# Patient Record
(demographics unavailable — no encounter records)

---

## 2024-12-16 NOTE — REASON FOR VISIT
[Home] : at home, [unfilled] , at the time of the visit. [Medical Office: ([facility])___] : at the medical office located in  [Family Member] : family member [Follow-Up Visit] : a follow-up [Other: _____] : [unfilled] [FreeTextEntry2] : rectal CA

## 2024-12-16 NOTE — CONSULT LETTER
[Dear  ___] : Dear  [unfilled], [Consult Letter:] : I had the pleasure of evaluating your patient, [unfilled]. [Please see my note below.] : Please see my note below. [Consult Closing:] : Thank you very much for allowing me to participate in the care of this patient.  If you have any questions, please do not hesitate to contact me. [Sincerely,] : Sincerely, [DrMarko  ___] : Dr. MONTAGUE

## 2024-12-16 NOTE — ASSESSMENT
[FreeTextEntry1] : Patient with low lying rectal adenocarcinoma. The tumor overlies the rectal sphincters and anal canal. Patient needs imaging for staging with MRI and CT scan. If localized, would likely proceed with OPRA-like treamtent, which involves organ preservation approach, as any surgery would involve an APR approach.  OPRA trial includes chemotherapy with Xeloda and radiation followed by FOLFOX, although XELOX could be reasonably substituted  Xeloda/RT D1 10/6/2021 D30 12/2/2021  C1 FOLFOX 1/5/2022. C4D1 3/2/2022 -delayed due to diarrhea C5 3/30- delayed due to inflammation noted on CT. C9 6/2/22  Rectal CA: We reviewed her CT from 6/4 which reveal CHLOE. She remains in remission. Next CT 12/2024. Next colonoscopy in 2025  Macular degeneration In bilateral eyes and being followed by Dr. Garrison Langley MD. Patient is receiving intravitreal bevacizumab into her R eye. Received a total of 2 doses for now.  During our televisit today we reviewed the folllowing;  Remains CHLOE. Scans were stable. Repeat June 2025 - CT and MR  Squamous cell carcinoma: Found to have cSCC on right lower leg (proximal ankle) Moh's was done by Dr. Burns. No issues.  HTN Currently on Valsartan 160 mg PO BID, Carvedelol 3.125 PO BID and Hydralazine 50 mg PO BID. Recently started on HCTZ 12.5 PRN and PO Potassium.  labs at local [facility] on 12/16  Patient instructed to call Dr. Roe office for repeat flex sig. They will also inquire when next colonoscopy is.  f/u in 6 month

## 2024-12-16 NOTE — HISTORY OF PRESENT ILLNESS
[Disease: _____________________] : Disease: [unfilled] [de-identified] : Mrs. Beatty is a 74 year old female with past medical history of HTN presenting to the office for an initial consultation of rectal CA.  Her last colonoscopy 2017 (benign polyp removed).  Patient presented x one year with fecal urgency, and "explosive diarrhea" and blood in stool over last 3-4 months. Blood in stool has gotten more frequent over time. She underwent colonoscopy on 8/16/2021 which revealed firm anorectal mass, distal rectal polyp and diverticulosis. Pathology of anorectal mass consistent with invasive moderately differentiated adenocarcinoma. IHC + for CK20 and CDX2; negative for CK7 and P40. MMR is proficient  Exam by colorectal surgeon: On digital examination a firm mass is palpable in the right posterolateral position. The mass is somewhat tethered. It was extremely distal. Sigmoidoscopy to 15 cm confirmed the presence of this friable distal anorectal lesion. Anoscopy also confirms the presence of this firm lesion which is friable and has a broken down surface. It is located in the right posterolateral position.  CT chest, abdomen/pelvis 9/9/2021: no evidence of metastatic disease. MR pelvis 9/9/2021: A low rectal cancer entirely contained within the anal canal. Stage: T1/T2 (tumor confined to rectal wall) N0 CRM (T3 tumors): N/A. Sphincter involvement (low rectal tumors): Question invasion of the internal sphincter posteriorly. Suspicious extra mesorectal nodes: None  9/17/2021: 1) rectal CA: Patient is here for follow up accompanied with daughter. She is feeling well today and voices no new complaints. She remains active and has no restrictions with her ADLs. We reviewed next steps which is chemo-RT. Xeloda will be administered on days of radiation only. We reviewed mechanism of action, logistics and most common adverse events. We reviewed labs which demonstrate no acute abnormality.  10/21/21: Patient case discussed at Rectal Psychiatric meeting 9/15/21. Lesion is low rectal and ~ 1.2 cm from AV. It is a clinical T1/T2 lesion as per MRI, and no suspicious nodes. Pathology report was reviewed, and pathology slides have been requested but not yet reviewed. It cannot be removed by transanal excision due to proximity to sphincter. Also, LAR is not possible since too close to sphincter, and an APR would be required. Therefore, we will proceed with combined chemotherapy and radiation, as per OPRA approach. Will also offer post-radiation chemotherapy, as this may further increase long term disease control & organ preservation. Will facilitate radiation and Xeloda initiation. Of note, patient declined genetic counseling appointment; however MMR is proficient. Ms Beatty started RT and Xeloda on 10/6/21. She is scheduled for RT from 10/6/21 to 11/16/21 She has been tolerating the Xeloda dose well. She has good appetite and has energy level.   11/4/21 Patient has developed severe and symptomatic hypotension on Coreg and Losartan. Will lower Coreg to 12.5, and stop Losartan. Discussed with PCP. Will lower Xeloda due to cramps/ nausea/ and diarhea Although DPD is normal, it is not a relaible test.  11/10/2021: Patient restarted Xeloda 2 tabs in AM/PM on Tuesday, 11/9. She is feeling better after having her break from Xeloda/RT due to her recent hospital admission. She was having ~ 10 episodes of diarrhea throughout the day time which has subsided ever since last night. She has no bowel movements today thus far. Patient was seen by her internist and performed lab work. Patient TSH was elevated and her Synthroid was increased from 125 mcg to 137 mcg since today. Her under-active thyroid was also likely causing her to feel increase in fatigue and "cold".   We reviewed her labs which reveal no acute abnormality. Her WBC and ANC are WNL, she received 3 doses of Zarxio last week.   11/22/2021: 1) Rectal CA: Today is D 24/30 Xeloda/RT She is tolerating Xeloda well and reports no significant adverse events. Denies fever, chills, HFS, diarrhea or mouth sores. Admits to mild diarrhea and incomplete evacuation.  She is no longer having "watery" diarrhea. Admits to fatigue but is able to push through.  12/2/2021: 1) Rectal CA:  Today is last day of Xeloda/RT, 30/30 fractions. She tolerated Xeloda relatively well while on treatment and voices no major adverse events. Denies fever, chills, HFS, diarrhea or mouth sores. Admits to rectal irritation/pain and was started on Oxycodone with relief of her symptoms. We reviewed next steps. Patient will require mediport in 1-2 weeks followed by chemotherapy. Patient and daughter are both interested in moving forward with FOLFOX over XELOX. We re-reviewed logistics, mechanism of action and most common adverse events.  12/30/2021 FOLFOX planned for 9 cycles. We reviewed drugs and side effects. Cold sensitivity, neuropathy   2/4/2022: 1) Rectal CA: C1 FOLFOX 1/5/2022. C3D3 FOLFOX today. 2) Hypokalemia: She is currently being treated with Potassium supplement.  Her K+ on 2/2 was 2.7 and Mg+ was 1.5. Will have her repeat CMP on Monday, 2/7/2022. 3) HTN: Due to hypotension Losartan/HCTZ and Carvedelol was DC. Her BP over the past few weeks have been elevated.  Her systolic BP today is 190s. I advised patient to restart Carvedelol twice daily.  Was taking it once daily. She has appointment with her internist on Monday, will discuss with her to restart Losartan-HCTZ 4) Diarrhea : At baseline patient has 3-4 bowel movements/day.  After induction of RT increased to 9-10 times/day. She is now having 3-4 bowel movement/day which is baseline.  3/4/2022: Rectal CA: C4D3 FOLFOX today. lowered  and 5FU 1600 Diarrhea has improved since reduction of treatment. She is having semi-formed stool ~ pencil like in caliber.  She is having ~ 2-3 bowel movements/day. Denies mucus or blood in stool. Denies abdominal pain or cramping. Due to diarrhea her electrolytes were abnormal.  Mg + is 1.4 and patient was advised to continue with Mg Sulfate once daily. K+ 3.6, she will cw KCl 10 meq daily. HTN: Patient is currently on Diovan 40 mg and Carvedilol 25 mg PO BID.  3/30/2022: C5D1 FOLFOX Patient underwent CT chest, abdomen/pelvis on 3/14 which revealed wall thickening of the distal sigmoid and rectum. This may be due to inflammation. Due to inflammation patient was evaluated by Dr. Roe who underwent sigmoidoscope which revealed complete response from treatment. She is doing well today and tolerating treatment relatively well. She denies diarrhea today however admits to intermittent diarrhea based on what she has for a meal.  Her stools are bulkier and denies blood in stool. Occasionally admits to mucus in stool. Denies abdominal pain, nausea, vomiting or neuropathy. We reviewed her labs, K+ during her last cycle was 3.6.  She is currently on maintenance K+ tablets. Due to hypothyroid, patient is currently on Synthroid 175 mcg daily.   4/15/2022: C6D3 FOLFOX today. Patient is doing well on treatment and voices no major adverse events. Admits to cold induce neuropathy only on days of treatment on her fingertips. No PO or LE involvement noted. Denies neuropathy on her her off week of treatment. She has no restrictions with her ADLs. Has intermittent episodes of diarrhea last week, ~ 3-4 bowel movements which has subsided. We reviewed her CMP, K+ was WNL.  4/29/2022: C7D3 FOLFOX today. Patient admits to neuropathy located on her fingertips which has not increased in intensity. Last week noticed mild neuropathy on her toes which has subsided. Patient also drove with her family to [place]. She did well there. Otherwise patient is doing relatively well on treatment. Has grade one fatigue but is able to push through. She is having mild vision changes and was evaluated by opthalmology. She has cataracts. We reviewed her labs, K+ wnl.  5/11/2022: C8D1 FOLFOX today. She is tolerating treatment relatively well and reports no significant adverse events. Admits to cold induce neuropathy located on her fingertips on days 1-7 of her cycle which subsides afterwards. She has no restrictions with using her hands. Admits to neuropathy located on the her toes which last a few days longer however has no bothersome to patient. Has not progressed since last cycle. Her bowel movements change from constipation to loose stools. On average patient has 3 bowel movements/day. We reviewed her labs, no electrolyte abnormality noted.   Overall she is doing well on treatment.  5/27/22 Had to hold treatment on 5/25 due to low platelets. Have planned to give C9 on 6/2/22. Has mild cold sensitivity today, and no weakness.  Main complaint is right low back pain and sciatica. 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 She went to ED on Sat and given Flexeril and Medrol. She felt much better.  Yesterday she fell and hurt her back - right leg gave out. She fell 6 steps onto the landing. Will compare to a few days ago.  7/20/2022 Case reviewed at Psychiatric today. Excellent response on MRI to OPRA-based regimen. WIll need to repeat MRI pelvis + CAT chest/abd q 6 months for 2 years, then annually. Will need labs with CEA and exam by surgeon q 3 months.  Patient notes bruising without noted trauma.  Patient with back pain, and fx L2. Kyphoplasty mid June (Dr John Gamez in [place]).  Port flush next month.  10/17/2022: Rectal CA: Patient underwent colonoscopy with Dr. Roe last week which revealed no malignant lesions.  Repeat colonoscopy x 3 years. Repeat sigmoidoscopy x 3 months. She is feeling relatively well and reports no significant complaints.  Admits to small bowel movements, ~ 3 times/day. No blood in stool. She underwent CT A/P on 09/2022 for w/u for elevated LFTs which revealed no evidence of recurrence and/or metastatic disease. Will order CT chest w/o contrast.  Will perform on @ [facility]. Port flush with labs today. Elevated LFTs: Patient is being evaluated by Dr. Shiv Putnam for her transaminitis.  She was originally seen by Dr. Paez ([facility]) who believes its 2/2 from gabapentin.  After stopping Gabapentin, her LFTs have dropped.  ALK phos is 326, ALT 47 and AST 42 on 9/26/2022. She underwent additional labs on Friday, 10/14/2022. CT A/P and US abdomen on 09/2022 did not reveal any acute process. After DC Gabapentin, her LFTs have improved. Will repeat levels today with port flush.  1/24/2023 Neuropathy - started ~ 4/2022 and still present.  We discussed that this will continue to improve. Had oxali 68 x 9 doses in total, so expect total or near total resolution. Last MRI negative, will get 2x/year Dr P q3 months. Excellent response on MRI to OPRA-based regimen. Increased LFTs - since 4/2022. Not clear.   12/19/23 NEUROPATHY Patient states that her leg neuropathy is stable.  Cumulative dose of oxaliplatin was 612 mg/m2, and would not expect this to worsen. Obtain additional lab panel today. Trial of oral vitamins (MVIT, B12/B complex)  RECTAL CANCER Rectal cancer remains in cCR after chemotherapy and chemo+RT. Last MRI 12/2023 negative. Will expedite CT reading. Will continue surveillance imaging and endoscopy as prior. At this point will recommend to remove mediport.  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 She will discuss treatment or observation with Dr Roe at next visit.  OSTEOPOROSIS she started Fosamax  CIRRHOSIS CT scan showed cirrhotic changes that were just reported, but seen on prior as per radiology. Referred to hepatology - should return annually. Not sure why this has been seen, and given the elevated alk phos, will refer to hepatology. I do not think this would be related to FOLFOX x 9 cycles, lloyd with dose reduced oxaliplatin.  6/12/24 Rectal CA: We reviewed her CT from 6/4 which reveal CHLOE. She remains in remission. Next CT 12/2024.  Macular degeneration In bilateral eyes and being followed by Dr. Garrison Langley MD. Patient is receiving intravitreal bevacizumab into her R eye. Received a total of 2 doses for now.  Depression Since developing macular degeneration patient developed worsening depression. Other factors personally have triggered her depression to worsen. Currently on Lexapro 10 mg daily but will require something else. Will require psychiatry evaluation/  12/16/24 - TEB Rectal CA: Remains CHLOE. Scans were stable. Repeat June 2025   Squamous cell carcinoma: Found to have cSCC on right lower leg (proximal ankle) Moh's was done by Dr. Burns. No issues.  HTN Currently on Valsartan 160 mg PO BID, Carvedelol 3.125 PO BID and Hydralazine 50 mg PO BID. Recently started on HCTZ 12.5 PRN and PO Potassium.  [de-identified] : adenocarcinoma [de-identified] : Colorectal Sx: Jamel Proctolu Radiation Oncology: Joey Miranda GI: Missael Hays ([facility]) PCP: Gabrielle Qaun [phone] Pain management: Franco Lopez ([place]) Dermatology: DEMARCUS Peña ([place]) - [facility] dermatology   Daughter - Aminah - [phone] Patient [phone] Claudia - son

## 2024-12-16 NOTE — REASON FOR VISIT
[Home] : at home, [unfilled] , at the time of the visit. [Medical Office: ([facility])___] : at the medical office located in  [Family Member] : family member [Follow-Up Visit] : a follow-up [Other: _____] : [unfilled] [FreeTextEntry2] : rectal CA

## 2024-12-16 NOTE — PHYSICAL EXAM
[Ambulatory and capable of all self care but unable to carry out any work activities] : Status 2- Ambulatory and capable of all self care but unable to carry out any work activities. Up and about more than 50% of waking hours [Normal] : affect appropriate [Ulcers] : no ulcers [Mucositis] : no mucositis [Thrush] : no thrush [Vesicles] : no vesicles [FreeTextEntry1] : low lying rectal tumor that overlies the spincter in the upper anal canal.

## 2024-12-16 NOTE — CONSULT LETTER
[Dear  ___] : Dear  [unfilled], [Consult Letter:] : I had the pleasure of evaluating your patient, [unfilled]. [Please see my note below.] : Please see my note below. [Consult Closing:] : Thank you very much for allowing me to participate in the care of this patient.  If you have any questions, please do not hesitate to contact me. [Sincerely,] : Sincerely, [DrMarko  ___] : Dr. OMNTAGUE

## 2024-12-16 NOTE — HISTORY OF PRESENT ILLNESS
[Disease: _____________________] : Disease: [unfilled] [de-identified] : Mrs. Beatty is a 74 year old female with past medical history of HTN presenting to the office for an initial consultation of rectal CA.  Her last colonoscopy 2017 (benign polyp removed).  Patient presented x one year with fecal urgency, and "explosive diarrhea" and blood in stool over last 3-4 months. Blood in stool has gotten more frequent over time. She underwent colonoscopy on 8/16/2021 which revealed firm anorectal mass, distal rectal polyp and diverticulosis. Pathology of anorectal mass consistent with invasive moderately differentiated adenocarcinoma. IHC + for CK20 and CDX2; negative for CK7 and P40. MMR is proficient  Exam by colorectal surgeon: On digital examination a firm mass is palpable in the right posterolateral position. The mass is somewhat tethered. It was extremely distal. Sigmoidoscopy to 15 cm confirmed the presence of this friable distal anorectal lesion. Anoscopy also confirms the presence of this firm lesion which is friable and has a broken down surface. It is located in the right posterolateral position.  CT chest, abdomen/pelvis 9/9/2021: no evidence of metastatic disease. MR pelvis 9/9/2021: A low rectal cancer entirely contained within the anal canal. Stage: T1/T2 (tumor confined to rectal wall) N0 CRM (T3 tumors): N/A. Sphincter involvement (low rectal tumors): Question invasion of the internal sphincter posteriorly. Suspicious extra mesorectal nodes: None  9/17/2021: 1) rectal CA: Patient is here for follow up accompanied with daughter. She is feeling well today and voices no new complaints. She remains active and has no restrictions with her ADLs. We reviewed next steps which is chemo-RT. Xeloda will be administered on days of radiation only. We reviewed mechanism of action, logistics and most common adverse events. We reviewed labs which demonstrate no acute abnormality.  10/21/21: Patient case discussed at Rectal [facility] meeting 9/15/21. Lesion is low rectal and ~ 1.2 cm from AV. It is a clinical T1/T2 lesion as per MRI, and no suspicious nodes. Pathology report was reviewed, and pathology slides have been requested but not yet reviewed. It cannot be removed by transanal excision due to proximity to sphincter. Also, LAR is not possible since too close to sphincter, and an APR would be required. Therefore, we will proceed with combined chemotherapy and radiation, as per OPRA approach. Will also offer post-radiation chemotherapy, as this may further increase long term disease control & organ preservation. Will facilitate radiation and Xeloda initiation. Of note, patient declined genetic counseling appointment; however MMR is proficient. Ms Beatty started RT and Xeloda on 10/6/21. She is scheduled for RT from 10/6/21 to 11/16/21 She has been tolerating the Xeloda dose well. She has good appetite and has energy level.   11/4/21 Patient has developed severe and symptomatic hypotension on Coreg and Losartan. Will lower Coreg to 12.5, and stop Losartan. Discussed with PCP. Will lower Xeloda due to cramps/ nausea/ and diarhea Although DPD is normal, it is not a relaible test.  11/10/2021: Patient restarted Xeloda 2 tabs in AM/PM on Tuesday, 11/9. She is feeling better after having her break from Xeloda/RT due to her recent hospital admission. She was having ~ 10 episodes of diarrhea throughout the day time which has subsided ever since last night. She has no bowel movements today thus far. Patient was seen by her internist and performed lab work. Patient TSH was elevated and her Synthroid was increased from 125 mcg to 137 mcg since today. Her under-active thyroid was also likely causing her to feel increase in fatigue and "cold".   We reviewed her labs which reveal no acute abnormality. Her WBC and ANC are WNL, she received 3 doses of Zarxio last week.   11/22/2021: 1) Rectal CA: Today is D 24/30 Xeloda/RT She is tolerating Xeloda well and reports no significant adverse events. Denies fever, chills, HFS, diarrhea or mouth sores. Admits to mild diarrhea and incomplete evacuation.  She is no longer having "watery" diarrhea. Admits to fatigue but is able to push through.  12/2/2021: 1) Rectal CA:  Today is last day of Xeloda/RT, 30/30 fractions. She tolerated Xeloda relatively well while on treatment and voices no major adverse events. Denies fever, chills, HFS, diarrhea or mouth sores. Admits to rectal irritation/pain and was started on Oxycodone with relief of her symptoms. We reviewed next steps. Patient will require mediport in 1-2 weeks followed by chemotherapy. Patient and daughter are both interested in moving forward with FOLFOX over XELOX. We re-reviewed logistics, mechanism of action and most common adverse events.  12/30/2021 FOLFOX planned for 9 cycles. We reviewed drugs and side effects. Cold sensitivity, neuropathy   2/4/2022: 1) Rectal CA: C1 FOLFOX 1/5/2022. C3D3 FOLFOX today. 2) Hypokalemia: She is currently being treated with Potassium supplement.  Her K+ on 2/2 was 2.7 and Mg+ was 1.5. Will have her repeat CMP on Monday, 2/7/2022. 3) HTN: Due to hypotension Losartan/HCTZ and Carvedelol was DC. Her BP over the past few weeks have been elevated.  Her systolic BP today is 190s. I advised patient to restart Carvedelol twice daily.  Was taking it once daily. She has appointment with her internist on Monday, will discuss with her to restart Losartan-HCTZ 4) Diarrhea : At baseline patient has 3-4 bowel movements/day.  After induction of RT increased to 9-10 times/day. She is now having 3-4 bowel movement/day which is baseline.  3/4/2022: Rectal CA: C4D3 FOLFOX today. lowered  and 5FU 1600 Diarrhea has improved since reduction of treatment. She is having semi-formed stool ~ pencil like in caliber.  She is having ~ 2-3 bowel movements/day. Denies mucus or blood in stool. Denies abdominal pain or cramping. Due to diarrhea her electrolytes were abnormal.  Mg + is 1.4 and patient was advised to continue with Mg Sulfate once daily. K+ 3.6, she will cw KCl 10 meq daily. HTN: Patient is currently on Diovan 40 mg and Carvedilol 25 mg PO BID.  3/30/2022: C5D1 FOLFOX Patient underwent CT chest, abdomen/pelvis on 3/14 which revealed wall thickening of the distal sigmoid and rectum. This may be due to inflammation. Due to inflammation patient was evaluated by Dr. Roe who underwent sigmoidoscope which revealed complete response from treatment. She is doing well today and tolerating treatment relatively well. She denies diarrhea today however admits to intermittent diarrhea based on what she has for a meal.  Her stools are bulkier and denies blood in stool. Occasionally admits to mucus in stool. Denies abdominal pain, nausea, vomiting or neuropathy. We reviewed her labs, K+ during her last cycle was 3.6.  She is currently on maintenance K+ tablets. Due to hypothyroid, patient is currently on Synthroid 175 mcg daily.   4/15/2022: C6D3 FOLFOX today. Patient is doing well on treatment and voices no major adverse events. Admits to cold induce neuropathy only on days of treatment on her fingertips. No PO or LE involvement noted. Denies neuropathy on her her off week of treatment. She has no restrictions with her ADLs. Has intermittent episodes of diarrhea last week, ~ 3-4 bowel movements which has subsided. We reviewed her CMP, K+ was WNL.  4/29/2022: C7D3 FOLFOX today. Patient admits to neuropathy located on her fingertips which has not increased in intensity. Last week noticed mild neuropathy on her toes which has subsided. Patient also drove with her family to [place]. She did well there. Otherwise patient is doing relatively well on treatment. Has grade one fatigue but is able to push through. She is having mild vision changes and was evaluated by opthalmology. She has cataracts. We reviewed her labs, K+ wnl.  5/11/2022: C8D1 FOLFOX today. She is tolerating treatment relatively well and reports no significant adverse events. Admits to cold induce neuropathy located on her fingertips on days 1-7 of her cycle which subsides afterwards. She has no restrictions with using her hands. Admits to neuropathy located on the her toes which last a few days longer however has no bothersome to patient. Has not progressed since last cycle. Her bowel movements change from constipation to loose stools. On average patient has 3 bowel movements/day. We reviewed her labs, no electrolyte abnormality noted.   Overall she is doing well on treatment.  5/27/22 Had to hold treatment on 5/25 due to low platelets. Have planned to give C9 on 6/2/22. Has mild cold sensitivity today, and no weakness.  Main complaint is right low back pain and sciatica. 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 She went to ED on Sat and given Flexeril and Medrol. She felt much better.  Yesterday she fell and hurt her back - right leg gave out. She fell 6 steps onto the landing. Will compare to a few days ago.  7/20/2022 Case reviewed at [facility] today. Excellent response on MRI to OPRA-based regimen. WIll need to repeat MRI pelvis + CAT chest/abd q 6 months for 2 years, then annually. Will need labs with CEA and exam by surgeon q 3 months.  Patient notes bruising without noted trauma.  Patient with back pain, and fx L2. Kyphoplasty mid June (Dr John Gamez in [place]).  Port flush next month.  10/17/2022: Rectal CA: Patient underwent colonoscopy with Dr. Roe last week which revealed no malignant lesions.  Repeat colonoscopy x 3 years. Repeat sigmoidoscopy x 3 months. She is feeling relatively well and reports no significant complaints.  Admits to small bowel movements, ~ 3 times/day. No blood in stool. She underwent CT A/P on 09/2022 for w/u for elevated LFTs which revealed no evidence of recurrence and/or metastatic disease. Will order CT chest w/o contrast.  Will perform on @ [facility]. Port flush with labs today. Elevated LFTs: Patient is being evaluated by Dr. Shiv Putnam for her transaminitis.  She was originally seen by Dr. Paez ([facility]) who believes its 2/2 from gabapentin.  After stopping Gabapentin, her LFTs have dropped.  ALK phos is 326, ALT 47 and AST 42 on 9/26/2022. She underwent additional labs on Friday, 10/14/2022. CT A/P and US abdomen on 09/2022 did not reveal any acute process. After DC Gabapentin, her LFTs have improved. Will repeat levels today with port flush.  1/24/2023 Neuropathy - started ~ 4/2022 and still present.  We discussed that this will continue to improve. Had oxali 68 x 9 doses in total, so expect total or near total resolution. Last MRI negative, will get 2x/year Dr P q3 months. Excellent response on MRI to OPRA-based regimen. Increased LFTs - since 4/2022. Not clear.   12/19/23 NEUROPATHY Patient states that her leg neuropathy is stable.  Cumulative dose of oxaliplatin was 612 mg/m2, and would not expect this to worsen. Obtain additional lab panel today. Trial of oral vitamins (MVIT, B12/B complex)  RECTAL CANCER Rectal cancer remains in cCR after chemotherapy and chemo+RT. Last MRI 12/2023 negative. Will expedite CT reading. Will continue surveillance imaging and endoscopy as prior. At this point will recommend to remove mediport.  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 She will discuss treatment or observation with Dr Roe at next visit.  OSTEOPOROSIS she started Fosamax  CIRRHOSIS CT scan showed cirrhotic changes that were just reported, but seen on prior as per radiology. Referred to hepatology - should return annually. Not sure why this has been seen, and given the elevated alk phos, will refer to hepatology. I do not think this would be related to FOLFOX x 9 cycles, lloyd with dose reduced oxaliplatin.  6/12/24 Rectal CA: We reviewed her CT from 6/4 which reveal CHLOE. She remains in remission. Next CT 12/2024.  Macular degeneration In bilateral eyes and being followed by Dr. Garrison Langley MD. Patient is receiving intravitreal bevacizumab into her R eye. Received a total of 2 doses for now.  Depression Since developing macular degeneration patient developed worsening depression. Other factors personally have triggered her depression to worsen. Currently on Lexapro 10 mg daily but will require something else. Will require psychiatry evaluation/  12/16/24 - TEB Rectal CA: Remains CHLOE. Scans were stable. Repeat June 2025   Squamous cell carcinoma: Found to have cSCC on right lower leg (proximal ankle) Moh's was done by Dr. Burns. No issues.  HTN Currently on Valsartan 160 mg PO BID, Carvedelol 3.125 PO BID and Hydralazine 50 mg PO BID. Recently started on HCTZ 12.5 PRN and PO Potassium.  [de-identified] : adenocarcinoma [de-identified] : Colorectal Sx: Jamel Proctolu Radiation Oncology: Joey Miranda GI: Missael Hays ([facility]) PCP: Gabrielle Quan [phone] Pain management: Franco Lopez ([place]) Dermatology: DEMARCUS Peña ([place]) - [facility] dermatology   Daughter - Aminah - [phone] Patient [phone] Claudia - son

## 2024-12-16 NOTE — ASSESSMENT
[FreeTextEntry1] : Patient with low lying rectal adenocarcinoma. The tumor overlies the rectal sphincters and anal canal. Patient needs imaging for staging with MRI and CT scan. If localized, would likely proceed with OPRA-like treamtent, which involves organ preservation approach, as any surgery would involve an APR approach.  OPRA trial includes chemotherapy with Xeloda and radiation followed by FOLFOX, although XELOX could be reasonably substituted  Xeloda/RT D1 10/6/2021 D30 12/2/2021  C1 FOLFOX 1/5/2022. C4D1 3/2/2022 -delayed due to diarrhea C5 3/30- delayed due to inflammation noted on CT. C9 6/2/22  Rectal CA: We reviewed her CT from 6/4 which reveal CHLOE. She remains in remission. Next CT 12/2024. Next colonoscopy in 2025  Macular degeneration In bilateral eyes and being followed by Dr. Garrison Langley MD. Patient is receiving intravitreal bevacizumab into her R eye. Received a total of 2 doses for now.  During our televisit today we reviewed the folllowing;  Remains CHLOE. Scans were stable. Repeat June 2025 - CT and MR  Squamous cell carcinoma: Found to have cSCC on right lower leg (proximal ankle) Moh's was done by Dr. Burns. No issues.  HTN Currently on Valsartan 160 mg PO BID, Carvedelol 3.125 PO BID and Hydralazine 50 mg PO BID. Recently started on HCTZ 12.5 PRN and PO Potassium.  labs at local [facility] on 12/16  Patient instructed to call Dr. Roe office for repeat flex sig. They will also inquire when next colonoscopy is.  f/u in 6 month

## 2025-01-30 NOTE — HISTORY OF PRESENT ILLNESS
[FreeTextEntry1] : Very pleasant 77-year-old female who presents for routine follow-up visit.  3-1/2 to 4 years ago the patient was diagnosed with a locally advanced distal rectal adenocarcinoma.  She was treated upfront with NIEVES and had a complete response.  She continues to follow-up closely with myself and medical oncology.  She denies any rectal bleeding or problems moving her bowels.  She has had a recent MRI which shows no evidence of recurrence.  Inspection of the anorectal area reveals some minor external hemorrhoids and minor skin changes.  Digital exam reveals no palpable mass.  Sigmoidoscopy was performed to the 13 cm level.  Upon withdrawal of the scope there is no evidence of local recurrence and only some mild distal radiation proctitis.  Patient and her daughter were informed of the encouraging results of this test.  We will perform a full colonoscopy on her in October 2025 which would be 3 years from her last full colonoscopy.  She will continue her follow-up with medical oncology

## 2025-02-27 NOTE — PLAN
[FreeTextEntry1] : 78-year-old female for AWV. Labs and bone density as ordered. Mammo/pap/colonoscopy UTD.   Hypertension.  Stable.  Continue current therapy. Keep f/u w/ cardio.   HLD. Cont Statin.   Anxiety. Cont lexapro.  Hypothyroidism. Cont synthroid.  Osteopenia. Cont Fosamax.  All questions answered.  Patient voiced understanding and in agreement to plan.  Return to clinic as recommended.

## 2025-06-23 NOTE — ASSESSMENT
[FreeTextEntry1] : Patient with low lying rectal adenocarcinoma. The tumor overlies the rectal sphincters and anal canal. Patient needs imaging for staging with MRI and CT scan. If localized, would likely proceed with OPRA-like treamtent, which involves organ preservation approach, as any surgery would involve an APR approach.  OPRA trial includes chemotherapy with Xeloda and radiation followed by FOLFOX, although XELOX could be reasonably substituted  Xeloda/RT D1 10/6/2021 D30 12/2/2021  C1 FOLFOX 1/5/2022. C4D1 3/2/2022 -delayed due to diarrhea C5 3/30- delayed due to inflammation noted on CT. C9 6/2/22  Rectal CA: We reviewed her CT and MR pelvis from 5/22/25 which reveal CHLOE. She remains in remission.  Continues to have worsening neuropathy which has increased. No history of DM2 or syphillis. Will check labs at local [facility].  Next CT and MRI on 12/2025 - ordered  Next colonoscopy in October 2025 with Dr. Roe.  f/u in 6 months after imaging

## 2025-06-23 NOTE — REASON FOR VISIT
[Follow-Up Visit] : a follow-up [Other: _____] : [unfilled] [Home] : at home, [unfilled] , at the time of the visit. [Medical Office: ([facility])___] : at the medical office located in  [Family Member] : family member [FreeTextEntry2] : rectal CA

## 2025-06-23 NOTE — HISTORY OF PRESENT ILLNESS
[Verbal consent obtained from patient] : the patient, [unfilled] [Disease: _____________________] : Disease: [unfilled] [de-identified] : Mrs. Beatty is a 74 year old female with past medical history of HTN presenting to the office for an initial consultation of rectal CA.  Her last colonoscopy 2017 (benign polyp removed).  Patient presented x one year with fecal urgency, and "explosive diarrhea" and blood in stool over last 3-4 months. Blood in stool has gotten more frequent over time. She underwent colonoscopy on 8/16/2021 which revealed firm anorectal mass, distal rectal polyp and diverticulosis. Pathology of anorectal mass consistent with invasive moderately differentiated adenocarcinoma. IHC + for CK20 and CDX2; negative for CK7 and P40. MMR is proficient  Exam by colorectal surgeon: On digital examination a firm mass is palpable in the right posterolateral position. The mass is somewhat tethered. It was extremely distal. Sigmoidoscopy to 15 cm confirmed the presence of this friable distal anorectal lesion. Anoscopy also confirms the presence of this firm lesion which is friable and has a broken down surface. It is located in the right posterolateral position.  CT chest, abdomen/pelvis 9/9/2021: no evidence of metastatic disease. MR pelvis 9/9/2021: A low rectal cancer entirely contained within the anal canal. Stage: T1/T2 (tumor confined to rectal wall) N0 CRM (T3 tumors): N/A. Sphincter involvement (low rectal tumors): Question invasion of the internal sphincter posteriorly. Suspicious extra mesorectal nodes: None  9/17/2021: 1) rectal CA: Patient is here for follow up accompanied with daughter. She is feeling well today and voices no new complaints. She remains active and has no restrictions with her ADLs. We reviewed next steps which is chemo-RT. Xeloda will be administered on days of radiation only. We reviewed mechanism of action, logistics and most common adverse events. We reviewed labs which demonstrate no acute abnormality.  10/21/21: Patient case discussed at Rectal [facility] meeting 9/15/21. Lesion is low rectal and ~ 1.2 cm from AV. It is a clinical T1/T2 lesion as per MRI, and no suspicious nodes. Pathology report was reviewed, and pathology slides have been requested but not yet reviewed. It cannot be removed by transanal excision due to proximity to sphincter. Also, LAR is not possible since too close to sphincter, and an APR would be required. Therefore, we will proceed with combined chemotherapy and radiation, as per [facility]A approach. Will also offer post-radiation chemotherapy, as this may further increase long term disease control & organ preservation. Will facilitate radiation and Xeloda initiation. Of note, patient declined genetic counseling appointment; however MMR is proficient. Ms Beatty started RT and Xeloda on 10/6/21. She is scheduled for RT from 10/6/21 to 11/16/21 She has been tolerating the Xeloda dose well. She has good appetite and has energy level.   11/4/21 Patient has developed severe and symptomatic hypotension on Coreg and Losartan. Will lower Coreg to 12.5, and stop Losartan. Discussed with PCP. Will lower Xeloda due to cramps/ nausea/ and diarhea Although DPD is normal, it is not a relaible test.  11/10/2021: Patient restarted Xeloda 2 tabs in AM/PM on Tuesday, 11/9. She is feeling better after having her break from Xeloda/RT due to her recent hospital admission. She was having ~ 10 episodes of diarrhea throughout the day time which has subsided ever since last night. She has no bowel movements today thus far. Patient was seen by her internist and performed lab work. Patient TSH was elevated and her Synthroid was increased from 125 mcg to 137 mcg since today. Her under-active thyroid was also likely causing her to feel increase in fatigue and "cold".   We reviewed her labs which reveal no acute abnormality. Her WBC and ANC are WNL, she received 3 doses of Zarxio last week.   11/22/2021: 1) Rectal CA: Today is D 24/30 Xeloda/RT She is tolerating Xeloda well and reports no significant adverse events. Denies fever, chills, HFS, diarrhea or mouth sores. Admits to mild diarrhea and incomplete evacuation.  She is no longer having "watery" diarrhea. Admits to fatigue but is able to push through.  12/2/2021: 1) Rectal CA:  Today is last day of Xeloda/RT, 30/30 fractions. She tolerated Xeloda relatively well while on treatment and voices no major adverse events. Denies fever, chills, HFS, diarrhea or mouth sores. Admits to rectal irritation/pain and was started on Oxycodone with relief of her symptoms. We reviewed next steps. Patient will require mediport in 1-2 weeks followed by chemotherapy. Patient and daughter are both interested in moving forward with FOLFOX over XELOX. We re-reviewed logistics, mechanism of action and most common adverse events.  12/30/2021 FOLFOX planned for 9 cycles. We reviewed drugs and side effects. Cold sensitivity, neuropathy   2/4/2022: 1) Rectal CA: C1 FOLFOX 1/5/2022. C3D3 FOLFOX today. 2) Hypokalemia: She is currently being treated with Potassium supplement.  Her K+ on 2/2 was 2.7 and Mg+ was 1.5. Will have her repeat CMP on Monday, 2/7/2022. 3) HTN: Due to hypotension Losartan/HCTZ and Carvedelol was DC. Her BP over the past few weeks have been elevated.  Her systolic BP today is 190s. I advised patient to restart Carvedelol twice daily.  Was taking it once daily. She has appointment with her internist on Monday, will discuss with her to restart Losartan-HCTZ 4) Diarrhea : At baseline patient has 3-4 bowel movements/day.  After induction of RT increased to 9-10 times/day. She is now having 3-4 bowel movement/day which is baseline.  3/4/2022: Rectal CA: C4D3 FOLFOX today. lowered  and 5FU 1600 Diarrhea has improved since reduction of treatment. She is having semi-formed stool ~ pencil like in caliber.  She is having ~ 2-3 bowel movements/day. Denies mucus or blood in stool. Denies abdominal pain or cramping. Due to diarrhea her electrolytes were abnormal.  Mg + is 1.4 and patient was advised to continue with Mg Sulfate once daily. K+ 3.6, she will cw KCl 10 meq daily. HTN: Patient is currently on Diovan 40 mg and Carvedilol 25 mg PO BID.  3/30/2022: C5D1 FOLFOX Patient underwent CT chest, abdomen/pelvis on 3/14 which revealed wall thickening of the distal sigmoid and rectum. This may be due to inflammation. Due to inflammation patient was evaluated by Dr. Roe who underwent sigmoidoscope which revealed complete response from treatment. She is doing well today and tolerating treatment relatively well. She denies diarrhea today however admits to intermittent diarrhea based on what she has for a meal.  Her stools are bulkier and denies blood in stool. Occasionally admits to mucus in stool. Denies abdominal pain, nausea, vomiting or neuropathy. We reviewed her labs, K+ during her last cycle was 3.6.  She is currently on maintenance K+ tablets. Due to hypothyroid, patient is currently on Synthroid 175 mcg daily.   4/15/2022: C6D3 FOLFOX today. Patient is doing well on treatment and voices no major adverse events. Admits to cold induce neuropathy only on days of treatment on her fingertips. No PO or LE involvement noted. Denies neuropathy on her her off week of treatment. She has no restrictions with her ADLs. Has intermittent episodes of diarrhea last week, ~ 3-4 bowel movements which has subsided. We reviewed her CMP, K+ was WNL.  4/29/2022: C7D3 FOLFOX today. Patient admits to neuropathy located on her fingertips which has not increased in intensity. Last week noticed mild neuropathy on her toes which has subsided. Patient also drove with her family to [place]. She did well there. Otherwise patient is doing relatively well on treatment. Has grade one fatigue but is able to push through. She is having mild vision changes and was evaluated by opthalmology. She has cataracts. We reviewed her labs, K+ wnl.  5/11/2022: C8D1 FOLFOX today. She is tolerating treatment relatively well and reports no significant adverse events. Admits to cold induce neuropathy located on her fingertips on days 1-7 of her cycle which subsides afterwards. She has no restrictions with using her hands. Admits to neuropathy located on the her toes which last a few days longer however has no bothersome to patient. Has not progressed since last cycle. Her bowel movements change from constipation to loose stools. On average patient has 3 bowel movements/day. We reviewed her labs, no electrolyte abnormality noted.   Overall she is doing well on treatment.  5/27/22 Had to hold treatment on 5/25 due to low platelets. Have planned to give C9 on 6/2/22. Has mild cold sensitivity today, and no weakness.  Main complaint is right low back pain and sciatica. 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 She went to ED on Sat and given Flexeril and Medrol. She felt much better.  Yesterday she fell and hurt her back - right leg gave out. She fell 6 steps onto the landing. Will compare to a few days ago.  7/20/2022 Case reviewed at [facility] today. Excellent response on MRI to OPRA-based regimen. WIll need to repeat MRI pelvis + CAT chest/abd q 6 months for 2 years, then annually. Will need labs with CEA and exam by surgeon q 3 months.  Patient notes bruising without noted trauma.  Patient with back pain, and fx L2. Kyphoplasty mid June (Dr John Gamez in [place]).  Port flush next month.  10/17/2022: Rectal CA: Patient underwent colonoscopy with Dr. Roe last week which revealed no malignant lesions.  Repeat colonoscopy x 3 years. Repeat sigmoidoscopy x 3 months. She is feeling relatively well and reports no significant complaints.  Admits to small bowel movements, ~ 3 times/day. No blood in stool. She underwent CT A/P on 09/2022 for w/u for elevated LFTs which revealed no evidence of recurrence and/or metastatic disease. Will order CT chest w/o contrast.  Will perform on @ [facility]. Port flush with labs today. Elevated LFTs: Patient is being evaluated by Dr. Shiv Putnam for her transaminitis.  She was originally seen by Dr. Paez ([facility]) who believes its 2/2 from gabapentin.  After stopping Gabapentin, her LFTs have dropped.  ALK phos is 326, ALT 47 and AST 42 on 9/26/2022. She underwent additional labs on Friday, 10/14/2022. CT A/P and US abdomen on 09/2022 did not reveal any acute process. After DC Gabapentin, her LFTs have improved. Will repeat levels today with port flush.  1/24/2023 Neuropathy - started ~ 4/2022 and still present.  We discussed that this will continue to improve. Had oxali 68 x 9 doses in total, so expect total or near total resolution. Last MRI negative, will get 2x/year Dr P q3 months. Excellent response on MRI to OPRA-based regimen. Increased LFTs - since 4/2022. Not clear.   12/19/23 NEUROPATHY Patient states that her leg neuropathy is stable.  Cumulative dose of oxaliplatin was 612 mg/m2, and would not expect this to worsen. Obtain additional lab panel today. Trial of oral vitamins (MVIT, B12/B complex)  RECTAL CANCER Rectal cancer remains in cCR after chemotherapy and chemo+RT. Last MRI 12/2023 negative. Will expedite CT reading. Will continue surveillance imaging and endoscopy as prior. At this point will recommend to remove mediport.  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 She will discuss treatment or observation with Dr Roe at next visit.  OSTEOPOROSIS she started Fosamax  CIRRHOSIS CT scan showed cirrhotic changes that were just reported, but seen on prior as per radiology. Referred to hepatology - should return annually. Not sure why this has been seen, and given the elevated alk phos, will refer to hepatology. I do not think this would be related to FOLFOX x 9 cycles, lolyd with dose reduced oxaliplatin.  6/12/24 Rectal CA: We reviewed her CT from 6/4 which reveal CHLOE. She remains in remission. Next CT 12/2024.  Macular degeneration In bilateral eyes and being followed by Dr. Garrison Langley MD. Patient is receiving intravitreal bevacizumab into her R eye. Received a total of 2 doses for now.  Depression Since developing macular degeneration patient developed worsening depression. Other factors personally have triggered her depression to worsen. Currently on Lexapro 10 mg daily but will require something else. Will require psychiatry evaluation/  12/16/24 - TEB Rectal CA: Remains CHLOE. Scans were stable. Repeat June 2025   Squamous cell carcinoma: Found to have cSCC on right lower leg (proximal ankle) Moh's was done by Dr. Burns. No issues.  HTN Currently on Valsartan 160 mg PO BID, Carvedelol 3.125 PO BID and Hydralazine 50 mg PO BID. Recently started on HCTZ 12.5 PRN and PO Potassium.  6/23/25 - TEB Rectal CA Remains CHLOE CT on 5/22/25 revealed CHLOE. Repeat CT in December 2025.   [de-identified] : adenocarcinoma [de-identified] : Colorectal Sx: Jamel Proctolu Radiation Oncology: Joey Miranda GI: Missael Hays ([facility]) PCP: Gabrielle Quan [phone] Pain management: Franco Lopez ([place]) Dermatology: DEMARCUS Peña ([place]) - [facility] dermatology   Daughter - Aminah - [phone] Patient [phone] Claudia - son